# Patient Record
Sex: FEMALE | Race: WHITE | NOT HISPANIC OR LATINO | Employment: FULL TIME | URBAN - METROPOLITAN AREA
[De-identification: names, ages, dates, MRNs, and addresses within clinical notes are randomized per-mention and may not be internally consistent; named-entity substitution may affect disease eponyms.]

---

## 2017-03-22 ENCOUNTER — TELEPHONE (OUTPATIENT)
Dept: ORTHOPEDIC SURGERY | Facility: CLINIC | Age: 47
End: 2017-03-22

## 2017-04-05 ENCOUNTER — OFFICE VISIT (OUTPATIENT)
Dept: ORTHOPEDIC SURGERY | Facility: CLINIC | Age: 47
End: 2017-04-05

## 2017-04-05 VITALS — WEIGHT: 215 LBS | BODY MASS INDEX: 35.82 KG/M2 | HEIGHT: 65 IN | TEMPERATURE: 97.6 F

## 2017-04-05 DIAGNOSIS — M77.41 METATARSALGIA OF BOTH FEET: ICD-10-CM

## 2017-04-05 DIAGNOSIS — M77.42 METATARSALGIA OF BOTH FEET: ICD-10-CM

## 2017-04-05 DIAGNOSIS — M79.672 BILATERAL FOOT PAIN: Primary | ICD-10-CM

## 2017-04-05 DIAGNOSIS — M79.671 BILATERAL FOOT PAIN: Primary | ICD-10-CM

## 2017-04-05 PROCEDURE — 73630 X-RAY EXAM OF FOOT: CPT | Performed by: ORTHOPAEDIC SURGERY

## 2017-04-05 PROCEDURE — 99204 OFFICE O/P NEW MOD 45 MIN: CPT | Performed by: ORTHOPAEDIC SURGERY

## 2017-04-06 NOTE — PROGRESS NOTES
"   New Patient Complaint      Patient: Bonny NAVARRO  YOB: 1970 46 y.o. female  Medical Record Number: [id number]    Chief Complaints: My feet hurt    History of Present Illness:  Patient has a 4-6 month history of moderate intermittent stabbing aching pain along the lateral aspect of both feet worse when she first gets up in the morning and is improved some with use of more accommodative athletic type shoes.  It's worse with prolonged standing and walking.  No injury of which she is aware.  She's had only minimal relief with use of ibuprofen.      HPI    Allergies:   Allergies   Allergen Reactions   • Xylocaine [Lidocaine]        Medications:   No current outpatient prescriptions on file prior to visit.     No current facility-administered medications on file prior to visit.        History reviewed. No pertinent past medical history.  History reviewed. No pertinent surgical history.  Social History     Occupational History   • Not on file.     Social History Main Topics   • Smoking status: Never Smoker   • Smokeless tobacco: Never Used   • Alcohol use Yes   • Drug use: No   • Sexual activity: Defer      Social History     Social History Narrative   • No narrative on file     History reviewed. No pertinent family history.    Review of Systems: 14 point review of systems performed, positive pertinent findings identified in HPI. All remaining systems negative     Review of Systems      Physical Exam:   Vitals:    04/05/17 1017   Temp: 97.6 °F (36.4 °C)   Weight: 215 lb (97.5 kg)   Height: 65\" (165.1 cm)     Physical Exam   Constitutional: pleasant, well developed   Eyes: sclera non icteric  Hearing : adequate for exam  Cardiovascular: palpable pulses in foot, calf/ thigh NT without sign of DVT  Respiratoy: breathing unlabored   Neurological: grossly sensate to LT throughout LE  Psychiatric: oriented with normal mood and affect.   Lymphatic: No palpable popliteal lymphadenopathy  Skin: intact " throughout leg/foot  Musculoskeletal:Nonantalgic gait.  Left feet show no obvious swelling warmth or erythema.  There is mild discomfort palpation along the midshaft of the fifth metatarsal bilaterally but no focal pain over the fifth MTP joints or base of the fifth metatarsals bilaterally with 5 out of 5 eversion strength.  On standing shows relatively neutral alignment may be slight pronation.  Physical Exam  Ortho Exam    Radiology: 3 views of both feet were ordered to evaluate foot pain reviewed and no prior x-rays available for comparison.  I don't see any gross malalignment.  There is a questionable area of some mild periosteal reaction along fifth metatarsal left greater than right.    Assessment/Plan: Bilateral foot pain of unclear etiology.  She doesn't have significant other musculoskeletal complaints other than a little bit of stiffness and wanted her fingers.  We'll have her continue with accommodative well cushioned sturdy shoes.  I want to get an MRI of both of her feet to make sure there is not some type of stress reaction involving given her focal persistent pain.  This will help tailor treatment algorithm I will see her back pending results.  She understands to call to schedule follow-up appointment once MRI has been scheduled

## 2017-04-19 ENCOUNTER — TELEPHONE (OUTPATIENT)
Dept: ORTHOPEDIC SURGERY | Facility: CLINIC | Age: 47
End: 2017-04-19

## 2017-04-19 DIAGNOSIS — M79.671 FOOT PAIN, BILATERAL: Primary | ICD-10-CM

## 2017-04-19 DIAGNOSIS — M79.672 FOOT PAIN, BILATERAL: Primary | ICD-10-CM

## 2017-05-02 ENCOUNTER — HOSPITAL ENCOUNTER (OUTPATIENT)
Dept: PHYSICAL THERAPY | Facility: HOSPITAL | Age: 47
Setting detail: THERAPIES SERIES
Discharge: HOME | End: 2017-05-02
Attending: ORTHOPAEDIC SURGERY

## 2017-05-02 DIAGNOSIS — M79.671 PAIN IN BOTH FEET: Primary | ICD-10-CM

## 2017-05-02 DIAGNOSIS — M79.672 PAIN IN BOTH FEET: Primary | ICD-10-CM

## 2017-05-02 PROCEDURE — 97162 PT EVAL MOD COMPLEX 30 MIN: CPT | Performed by: PHYSICAL THERAPIST

## 2017-05-02 PROCEDURE — 97110 THERAPEUTIC EXERCISES: CPT | Performed by: PHYSICAL THERAPIST

## 2017-06-07 ENCOUNTER — DOCUMENTATION (OUTPATIENT)
Dept: PHYSICAL THERAPY | Facility: HOSPITAL | Age: 47
End: 2017-06-07

## 2017-06-07 DIAGNOSIS — M79.672 PAIN IN BOTH FEET: Primary | ICD-10-CM

## 2017-06-07 DIAGNOSIS — M79.671 PAIN IN BOTH FEET: Primary | ICD-10-CM

## 2017-06-07 NOTE — THERAPY DISCHARGE NOTE
Outpatient Physical Therapy Discharge Summary         Patient Name: Bonny NAVARRO  : 1970  MRN: [id number]    Today's Date: 2017    Visit Dx:    ICD-10-CM ICD-9-CM   1. Pain in both feet M79.671 729.5    M79.672              PT OP Goals       17 1400       PT Short Term Goals    STG Date to Achieve 17  -KH     STG 1 Patient will demonstrate safety and independence with initial HEP  -KH     STG 1 Progress Not Met  -KH     STG 2 Patient will report reduction in symptoms from 3x/week to </=1x/week  -KH     STG 2 Progress Not Met  -KH     STG 3 Patient will report reductionin peak pain level from 9/10 to 7/10  -KH     STG 3 Progress Not Met  -KH     Long Term Goals    LTG Date to Achieve 17  -KH     LTG 1 Patient will demonstrate safety and independence with advanced core stabilization exercises.  -KH     LTG 1 Progress Not Met  -KH     LTG 2 Patient will demonstrate good postural awareness with therapy exercises  -KH     LTG 2 Progress Not Met  -KH     LTG 3 Patient will report reductionin peak pain level from 9/10 to 5/10  -KH     LTG 3 Progress Not Met  -KH       User Key  (r) = Recorded By, (t) = Taken By, (c) = Cosigned By    Initials Name Provider Type    PREM Barnard, RODERICK Physical Therapist          OP PT Discharge Summary  Date of Discharge: 17  Reason for Discharge: Unable to participate  Outcomes Achieved: Unable to tolerate or actively participate in therapy  Discharge Destination: Home without follow-up  Discharge Instructions: Patient evaluated and never scheduled follow up appointments      Time Calculation:                    Sneha Barnard PT  2017

## 2020-01-29 NOTE — TELEPHONE ENCOUNTER
I spoke with patient she is decided not to have the MRIs due to high cost.  We talked about treatment options and like to get her in [place] over at [facility] for physical therapy evaluation for some possible things for her feet possibly orthotics.  She'll do that and if things don't improve over the next several months she'll let me know and we'll go further.  
n/a

## 2022-04-25 ENCOUNTER — OFFICE VISIT (OUTPATIENT)
Dept: FAMILY MEDICINE CLINIC | Facility: CLINIC | Age: 52
End: 2022-04-25

## 2022-04-25 VITALS
SYSTOLIC BLOOD PRESSURE: 126 MMHG | HEART RATE: 87 BPM | BODY MASS INDEX: 39.65 KG/M2 | WEIGHT: 238 LBS | DIASTOLIC BLOOD PRESSURE: 86 MMHG | HEIGHT: 65 IN | OXYGEN SATURATION: 98 % | TEMPERATURE: 98 F

## 2022-04-25 DIAGNOSIS — Z12.11 COLON CANCER SCREENING: ICD-10-CM

## 2022-04-25 DIAGNOSIS — Z13.0 SCREENING FOR IRON DEFICIENCY ANEMIA: ICD-10-CM

## 2022-04-25 DIAGNOSIS — M54.50 ACUTE MIDLINE LOW BACK PAIN WITHOUT SCIATICA: ICD-10-CM

## 2022-04-25 DIAGNOSIS — R06.83 SNORING: ICD-10-CM

## 2022-04-25 DIAGNOSIS — Z00.00 ROUTINE GENERAL MEDICAL EXAMINATION AT A HEALTH CARE FACILITY: Primary | ICD-10-CM

## 2022-04-25 DIAGNOSIS — Z13.6 SCREENING FOR ISCHEMIC HEART DISEASE: ICD-10-CM

## 2022-04-25 DIAGNOSIS — Z13.1 SCREENING FOR DIABETES MELLITUS: ICD-10-CM

## 2022-04-25 PROCEDURE — 99396 PREV VISIT EST AGE 40-64: CPT | Performed by: NURSE PRACTITIONER

## 2022-04-25 PROCEDURE — 99213 OFFICE O/P EST LOW 20 MIN: CPT | Performed by: NURSE PRACTITIONER

## 2022-04-25 RX ORDER — CYCLOBENZAPRINE HCL 10 MG
10 TABLET ORAL 3 TIMES DAILY PRN
Qty: 30 TABLET | Refills: 0 | Status: SHIPPED | OUTPATIENT
Start: 2022-04-25 | End: 2022-08-16

## 2022-04-25 NOTE — PROGRESS NOTES
"Chief Complaint  Annual Exam (Pt not fasting for labs, says she hasn't had physical in a while but has not been sick ) and Back Pain (Pt gets tightness in lower back after standing for long periods pf time, happens every other day)    Subjective          Bonny NAVARRO presents to [facility]  History of Present Illness  Well Adult Physical: Patient here for a comprehensive physical exam.The patient reports see below  Do you take any herbs or supplements that were not prescribed by a doctor? no Are you taking calcium supplements? no Are you taking aspirin daily? no    Low back pain- pt has a tightness across low back if standing for a long period of time or doing any work that involves a lot of bending over like yard work.  No numbness or tingling.    Snoring- pt has snored for years and can \sleep all night but stil wake up tired.  Requesting referral for sleep study        Objective   Vital Signs:   /86   Pulse 87   Temp 98 °F (36.7 °C)   Ht 165.1 cm (65\")   Wt 108 kg (238 lb)   SpO2 98%   BMI 39.61 kg/m²            Physical Exam  Vitals reviewed.   Constitutional:       General: She is not in acute distress.     Appearance: She is well-developed. She is not diaphoretic.   HENT:      Head: Normocephalic and atraumatic. Hair is normal.      Right Ear: Hearing, tympanic membrane, ear canal and external ear normal. No decreased hearing noted. No drainage.      Left Ear: Hearing, tympanic membrane, ear canal and external ear normal. No decreased hearing noted.      Nose: No nasal deformity.   Eyes:      General: Lids are normal.         Right eye: No discharge.         Left eye: No discharge.      Conjunctiva/sclera: Conjunctivae normal.      Pupils: Pupils are equal, round, and reactive to light.   Neck:      Thyroid: No thyromegaly.      Vascular: No JVD.      Trachea: No tracheal deviation.   Cardiovascular:      Rate and Rhythm: Normal rate and regular rhythm.      " Pulses: Normal pulses.      Heart sounds: Normal heart sounds. No murmur heard.    No friction rub. No gallop.   Pulmonary:      Effort: Pulmonary effort is normal. No respiratory distress.      Breath sounds: Normal breath sounds. No wheezing or rales.   Chest:      Chest wall: No tenderness.   Abdominal:      General: Bowel sounds are normal. There is no distension.      Palpations: Abdomen is soft. There is no mass.      Tenderness: There is no abdominal tenderness. There is no guarding or rebound.      Hernia: No hernia is present.   Musculoskeletal:         General: No tenderness or deformity. Normal range of motion.      Cervical back: Normal range of motion and neck supple.   Lymphadenopathy:      Cervical: No cervical adenopathy.   Skin:     General: Skin is warm and dry.      Findings: No erythema or rash.   Neurological:      Mental Status: She is alert and oriented to person, place, and time.      Cranial Nerves: No cranial nerve deficit.      Motor: No abnormal muscle tone.      Coordination: Coordination normal.      Deep Tendon Reflexes: Reflexes are normal and symmetric. Reflexes normal.   Psychiatric:         Behavior: Behavior normal.         Thought Content: Thought content normal.         Judgment: Judgment normal.        Result Review :   The following data was reviewed by: DON Holman on 04/25/2022:                              Assessment and Plan    Diagnoses and all orders for this visit:    1. Routine general medical examination at a health care facility (Primary)    2. Screening for iron deficiency anemia  -     CBC & Differential    3. Screening for diabetes mellitus  -     Comprehensive Metabolic Panel    4. Screening for ischemic heart disease  -     Lipid Panel With LDL / HDL Ratio    5. Colon cancer screening  -     Cologuard - Stool, Per Rectum; Future    6. Acute midline low back pain without sciatica  -     cyclobenzaprine (FLEXERIL) 10 MG tablet; Take 1 tablet by mouth 3  (Three) Times a Day As Needed for Muscle Spasms.  Dispense: 30 tablet; Refill: 0    7. Snoring  -     Ambulatory Referral to Sleep Medicine        Follow Up   Return if symptoms worsen or fail to improve.  Patient was given instructions and counseling regarding her condition or for health maintenance advice. Please see specific information pulled into the AVS if appropriate.     Discussed weight, diet and exercise  Follow up after labs  Referral to sleep med  Discussed back pain and weight loss, exercises to strengthen core and back   Mask and googles worn

## 2022-04-27 LAB
ALBUMIN SERPL-MCNC: 4.3 G/DL (ref 3.8–4.8)
ALBUMIN/GLOB SERPL: 1.9 {RATIO} (ref 1.2–2.2)
ALP SERPL-CCNC: 69 IU/L (ref 44–121)
ALT SERPL-CCNC: 28 IU/L (ref 0–32)
AST SERPL-CCNC: 17 IU/L (ref 0–40)
BASOPHILS # BLD AUTO: 0 X10E3/UL (ref 0–0.2)
BASOPHILS NFR BLD AUTO: 1 %
BILIRUB SERPL-MCNC: 0.4 MG/DL (ref 0–1.2)
BUN SERPL-MCNC: 12 MG/DL (ref 6–24)
BUN/CREAT SERPL: 14 (ref 9–23)
CALCIUM SERPL-MCNC: 9.7 MG/DL (ref 8.7–10.2)
CHLORIDE SERPL-SCNC: 102 MMOL/L (ref 96–106)
CHOLEST SERPL-MCNC: 213 MG/DL (ref 100–199)
CO2 SERPL-SCNC: 22 MMOL/L (ref 20–29)
CREAT SERPL-MCNC: 0.86 MG/DL (ref 0.57–1)
EGFRCR SERPLBLD CKD-EPI 2021: 83 ML/MIN/1.73
EOSINOPHIL # BLD AUTO: 0.2 X10E3/UL (ref 0–0.4)
EOSINOPHIL NFR BLD AUTO: 3 %
ERYTHROCYTE [DISTWIDTH] IN BLOOD BY AUTOMATED COUNT: 12.5 % (ref 11.7–15.4)
GLOBULIN SER CALC-MCNC: 2.3 G/DL (ref 1.5–4.5)
GLUCOSE SERPL-MCNC: 171 MG/DL (ref 65–99)
HCT VFR BLD AUTO: 46.4 % (ref 34–46.6)
HDLC SERPL-MCNC: 43 MG/DL
HGB BLD-MCNC: 16.2 G/DL (ref 11.1–15.9)
IMM GRANULOCYTES # BLD AUTO: 0 X10E3/UL (ref 0–0.1)
IMM GRANULOCYTES NFR BLD AUTO: 0 %
LDLC SERPL CALC-MCNC: 134 MG/DL (ref 0–99)
LDLC/HDLC SERPL: 3.1 RATIO (ref 0–3.2)
LYMPHOCYTES # BLD AUTO: 2.3 X10E3/UL (ref 0.7–3.1)
LYMPHOCYTES NFR BLD AUTO: 38 %
MCH RBC QN AUTO: 31.2 PG (ref 26.6–33)
MCHC RBC AUTO-ENTMCNC: 34.9 G/DL (ref 31.5–35.7)
MCV RBC AUTO: 89 FL (ref 79–97)
MONOCYTES # BLD AUTO: 0.4 X10E3/UL (ref 0.1–0.9)
MONOCYTES NFR BLD AUTO: 6 %
NEUTROPHILS # BLD AUTO: 3.2 X10E3/UL (ref 1.4–7)
NEUTROPHILS NFR BLD AUTO: 52 %
PLATELET # BLD AUTO: 306 X10E3/UL (ref 150–450)
POTASSIUM SERPL-SCNC: 4.5 MMOL/L (ref 3.5–5.2)
PROT SERPL-MCNC: 6.6 G/DL (ref 6–8.5)
RBC # BLD AUTO: 5.2 X10E6/UL (ref 3.77–5.28)
SODIUM SERPL-SCNC: 140 MMOL/L (ref 134–144)
TRIGL SERPL-MCNC: 204 MG/DL (ref 0–149)
VLDLC SERPL CALC-MCNC: 36 MG/DL (ref 5–40)
WBC # BLD AUTO: 6.1 X10E3/UL (ref 3.4–10.8)

## 2022-04-29 DIAGNOSIS — R73.09 ELEVATED GLUCOSE: Primary | ICD-10-CM

## 2022-04-30 LAB
HBA1C MFR BLD: 8.2 % (ref 4.8–5.6)
Lab: NORMAL
WRITTEN AUTHORIZATION: NORMAL

## 2022-05-03 ENCOUNTER — OFFICE VISIT (OUTPATIENT)
Dept: FAMILY MEDICINE CLINIC | Facility: CLINIC | Age: 52
End: 2022-05-03

## 2022-05-03 VITALS
OXYGEN SATURATION: 98 % | BODY MASS INDEX: 39.49 KG/M2 | DIASTOLIC BLOOD PRESSURE: 88 MMHG | WEIGHT: 237 LBS | TEMPERATURE: 97.8 F | HEART RATE: 84 BPM | HEIGHT: 65 IN | SYSTOLIC BLOOD PRESSURE: 138 MMHG

## 2022-05-03 DIAGNOSIS — E11.65 TYPE 2 DIABETES MELLITUS WITH HYPERGLYCEMIA, WITHOUT LONG-TERM CURRENT USE OF INSULIN: Primary | ICD-10-CM

## 2022-05-03 PROCEDURE — 99213 OFFICE O/P EST LOW 20 MIN: CPT | Performed by: NURSE PRACTITIONER

## 2022-05-03 RX ORDER — METFORMIN HYDROCHLORIDE 500 MG/1
1000 TABLET, EXTENDED RELEASE ORAL
Qty: 180 TABLET | Refills: 1 | Status: SHIPPED | OUTPATIENT
Start: 2022-05-03 | End: 2022-08-16 | Stop reason: SDUPTHER

## 2022-05-03 NOTE — PROGRESS NOTES
"Chief Complaint  Diabetes (Pt newly diagnosed after high a1c level )    Subjective          Bonny JUSTICE RAMON presents to [facility]  History of Present Illness  Patient presenting to the office today to follow-up on recent lab work that showed an A1c of 8.2.  Discussed diabetes at length with the patient and we have agreed for her to start on metformin extended release 1000 mg daily with a meal.  We also discussed her cholesterol being elevated and the new goal for her LDL is to be less than 70.  Patient like to wait on adding this medication for 6 months to see if she can do lifestyle changes including diet exercise and weight loss and then recheck this lab.  Objective   Vital Signs:   /88   Pulse 84   Temp 97.8 °F (36.6 °C)   Ht 165.1 cm (65\")   Wt 108 kg (237 lb)   SpO2 98%   BMI 39.44 kg/m²     Physical Exam  Vitals reviewed.   Constitutional:       General: She is not in acute distress.     Appearance: She is well-developed.   HENT:      Head: Normocephalic.   Cardiovascular:      Rate and Rhythm: Normal rate and regular rhythm.      Heart sounds: Normal heart sounds.   Pulmonary:      Effort: Pulmonary effort is normal.      Breath sounds: Normal breath sounds.   Neurological:      Mental Status: She is alert and oriented to person, place, and time.      Gait: Gait normal.   Psychiatric:         Behavior: Behavior normal.         Thought Content: Thought content normal.         Judgment: Judgment normal.        Result Review :   The following data was reviewed by: DON Holman on 05/03/2022:  CMP    CMP 4/26/22   Glucose 171 (A)   BUN 12   Creatinine 0.86   Sodium 140   Potassium 4.5   Chloride 102   Calcium 9.7   Total Protein 6.6   Albumin 4.3   Globulin 2.3   Total Bilirubin 0.4   Alkaline Phosphatase 69   AST (SGOT) 17   ALT (SGPT) 28   (A) Abnormal value            CBC w/diff    CBC w/Diff 4/26/22   WBC 6.1   RBC 5.20   Hemoglobin 16.2 (A)   Hematocrit " 46.4   MCV 89   MCH 31.2   MCHC 34.9   RDW 12.5   Platelets 306   Neutrophil Rel % 52   Lymphocyte Rel % 38   Monocyte Rel % 6   Eosinophil Rel % 3   Basophil Rel % 1   (A) Abnormal value            Lipid Panel    Lipid Panel 4/26/22   Total Cholesterol 213 (A)   Triglycerides 204 (A)   HDL Cholesterol 43   VLDL Cholesterol 36   LDL Cholesterol  134 (A)   LDL/HDL Ratio 3.1   (A) Abnormal value       Comments are available for some flowsheets but are not being displayed.               Most Recent A1C    HGBA1C Most Recent 4/26/22   Hemoglobin A1C 8.2 (A)   (A) Abnormal value       Comments are available for some flowsheets but are not being displayed.                     Assessment and Plan    Diagnoses and all orders for this visit:    1. Type 2 diabetes mellitus with hyperglycemia, without long-term current use of insulin (HCC) (Primary)  -     metFORMIN ER (Glucophage XR) 500 MG 24 hr tablet; Take 2 tablets by mouth Daily With Breakfast.  Dispense: 180 tablet; Refill: 1               Follow Up   Return in about 3 months (around 8/3/2022) for Recheck.  Patient was given instructions and counseling regarding her condition or for health maintenance advice. Please see specific information pulled into the AVS if appropriate.     I do not want the patient to be checking her blood sugar at this time because I am afraid that she will be overwhelmed.  I would like for her to more focus on changing her lifestyle including diet weight loss and increasing activity.  Metformin ER 1000 mg once a day she does need return to the office in 3 months for an A1c check and return in the office in 6 months for a cholesterol check    Mask and googles worn

## 2022-06-21 ENCOUNTER — APPOINTMENT (OUTPATIENT)
Dept: SLEEP MEDICINE | Facility: HOSPITAL | Age: 52
End: 2022-06-21

## 2022-06-21 ENCOUNTER — TELEPHONE (OUTPATIENT)
Dept: FAMILY MEDICINE CLINIC | Facility: CLINIC | Age: 52
End: 2022-06-21

## 2022-06-21 NOTE — TELEPHONE ENCOUNTER
Informed pt that her results are negative for her cologuard test. Also spoke to pt regarding her needing to choose a new PCP she wants to schedule with Dr. Aviles once her schedule becomes available. Pt is aware of results

## 2022-08-15 ENCOUNTER — APPOINTMENT (OUTPATIENT)
Dept: SLEEP MEDICINE | Facility: HOSPITAL | Age: 52
End: 2022-08-15

## 2022-08-16 ENCOUNTER — OFFICE VISIT (OUTPATIENT)
Dept: FAMILY MEDICINE CLINIC | Facility: CLINIC | Age: 52
End: 2022-08-16

## 2022-08-16 VITALS
TEMPERATURE: 97.1 F | DIASTOLIC BLOOD PRESSURE: 82 MMHG | BODY MASS INDEX: 34.52 KG/M2 | RESPIRATION RATE: 16 BRPM | HEART RATE: 64 BPM | SYSTOLIC BLOOD PRESSURE: 116 MMHG | HEIGHT: 65 IN | OXYGEN SATURATION: 99 % | WEIGHT: 207.2 LBS

## 2022-08-16 DIAGNOSIS — E78.2 MODERATE MIXED HYPERLIPIDEMIA NOT REQUIRING STATIN THERAPY: Chronic | ICD-10-CM

## 2022-08-16 DIAGNOSIS — Z00.00 ENCOUNTER FOR HEALTH MAINTENANCE EXAMINATION: ICD-10-CM

## 2022-08-16 DIAGNOSIS — E11.65 TYPE 2 DIABETES MELLITUS WITH HYPERGLYCEMIA, WITHOUT LONG-TERM CURRENT USE OF INSULIN: Primary | ICD-10-CM

## 2022-08-16 LAB
EXPIRATION DATE: NORMAL
HBA1C MFR BLD: 5.8 %
Lab: NORMAL

## 2022-08-16 PROCEDURE — 99214 OFFICE O/P EST MOD 30 MIN: CPT | Performed by: STUDENT IN AN ORGANIZED HEALTH CARE EDUCATION/TRAINING PROGRAM

## 2022-08-16 PROCEDURE — 90471 IMMUNIZATION ADMIN: CPT | Performed by: STUDENT IN AN ORGANIZED HEALTH CARE EDUCATION/TRAINING PROGRAM

## 2022-08-16 PROCEDURE — 83036 HEMOGLOBIN GLYCOSYLATED A1C: CPT | Performed by: STUDENT IN AN ORGANIZED HEALTH CARE EDUCATION/TRAINING PROGRAM

## 2022-08-16 PROCEDURE — 90715 TDAP VACCINE 7 YRS/> IM: CPT | Performed by: STUDENT IN AN ORGANIZED HEALTH CARE EDUCATION/TRAINING PROGRAM

## 2022-08-16 RX ORDER — CETIRIZINE HYDROCHLORIDE 10 MG/1
TABLET ORAL
COMMUNITY
Start: 2013-10-21

## 2022-08-16 RX ORDER — METFORMIN HYDROCHLORIDE 500 MG/1
500 TABLET, EXTENDED RELEASE ORAL
Qty: 180 TABLET | Refills: 1 | Status: SHIPPED | OUTPATIENT
Start: 2022-08-16 | End: 2022-11-18

## 2022-08-16 NOTE — PROGRESS NOTES
"Chief Complaint  Diabetes (A1c checked.) and Establish Care    Subjective        Bonny KIKA Annettecolby Boucher presents to [facility]  Patient is doing well today.  She has lost 30 pounds since her last visit.  She has primarily done this through diet specifically intermittent fasting.  She has also been walking more.    Was started on metformin at her last appointment.  Initially had 2 weeks of diarrhea but that has since resolved.  She states that she is actually more constipated.  She started taking daily MiraLAX.    No other concerns today.      Objective   Vital Signs:  /82   Pulse 64   Temp 97.1 °F (36.2 °C)   Resp 16   Ht 165.1 cm (65\")   Wt 94 kg (207 lb 3.2 oz)   SpO2 99%   BMI 34.48 kg/m²   Estimated body mass index is 34.48 kg/m² as calculated from the following:    Height as of this encounter: 165.1 cm (65\").    Weight as of this encounter: 94 kg (207 lb 3.2 oz).    BMI is >= 30 and <35. (Class 1 Obesity). The following options were offered after discussion;: exercise counseling/recommendations, nutrition counseling/recommendations and congratulated on 30lb weight loss.      Physical Exam  Constitutional:       General: She is not in acute distress.  Eyes:      Conjunctiva/sclera: Conjunctivae normal.   Cardiovascular:      Rate and Rhythm: Normal rate and regular rhythm.   Pulmonary:      Effort: Pulmonary effort is normal. No respiratory distress.      Breath sounds: Normal breath sounds.   Musculoskeletal:      Right lower leg: No edema.      Left lower leg: No edema.   Skin:     General: Skin is warm and dry.   Neurological:      Mental Status: She is alert and oriented to person, place, and time.   Psychiatric:         Mood and Affect: Mood normal.         Behavior: Behavior normal.        Result Review :  The following data was reviewed by: Ny Aviles MD on 08/16/2022:  Common labs    Common Labsle 4/26/22 4/26/22 4/26/22 4/26/22 8/16/22    0829 0829 0829 " 0829    Glucose  171 (A)      BUN  12      Creatinine  0.86      Sodium  140      Potassium  4.5      Chloride  102      Calcium  9.7      Total Protein  6.6      Albumin  4.3      Total Bilirubin  0.4      Alkaline Phosphatase  69      AST (SGOT)  17      ALT (SGPT)  28      WBC 6.1       Hemoglobin 16.2 (A)       Hematocrit 46.4       Platelets 306       Total Cholesterol   213 (A)     Triglycerides   204 (A)     HDL Cholesterol   43     LDL Cholesterol    134 (A)     Hemoglobin A1C    8.2 (A) 5.8   (A) Abnormal value       Comments are available for some flowsheets but are not being displayed.           Data reviewed: none          Assessment and Plan   Diagnoses and all orders for this visit:    1. Type 2 diabetes mellitus with hyperglycemia, without long-term current use of insulin (HCC) (Primary)  Assessment & Plan:  Controlled without evidence of neuropathy, nephropathy, vision changes.  Diagnosed in 4/2022.  Last hemoglobin A1c of 8.2% on 4/2022.  A1c today 5.8%  Goal A1c of 7%.  Asymptomatic without polyuria or polydipsia, no symptoms of hyper or hypoglycemia.    Current medications: Metformin 1000 mg daily.  Not on statin/ACEi.  Patient has lost 30 pounds since last appointment.   Decrease metformin to 500 mg daily.          Orders:  -     POC Glycosylated Hemoglobin (Hb A1C)  -     Lipid Panel  -     metFORMIN ER (Glucophage XR) 500 MG 24 hr tablet; Take 1 tablet by mouth Daily With Breakfast.  Dispense: 180 tablet; Refill: 1    2. Encounter for health maintenance examination  Assessment & Plan:  Colonoscopy: Negative Cologuard June 2022.  Repeat 2025.  Cervical Cancer Screening: Cotesting negative 2019.  Repeat in 2024.  Mammogram: Normal 2022.  Repeat yearly  LDCT: Never smoker  DEXA: Indicated age 67    Immunizations: Tdap given today  Labs: Up-to-date      Orders:  -     Tdap Vaccine Greater Than or Equal To 8yo IM    3. Moderate mixed hyperlipidemia not requiring statin therapy  Assessment &  Plan:  LDL above goal of 100 in 4/2022.  Not currently on statin therapy.  Given 30 pound weight loss, will recheck today and calculate ASCVD risk based on new lipid profile.  We will determine statin therapy at that time if indicated.           I spent 30 minutes caring for Bonny on this date of service. This time includes time spent by me in the following activities:preparing for the visit, reviewing tests, performing a medically appropriate examination and/or evaluation , counseling and educating the patient/family/caregiver, ordering medications, tests, or procedures and documenting information in the medical record  Follow Up   Return in about 3 months (around 11/16/2022) for DM2.  Patient was given instructions and counseling regarding her condition or for health maintenance advice. Please see specific information pulled into the AVS if appropriate.

## 2022-08-16 NOTE — ASSESSMENT & PLAN NOTE
LDL above goal of 100 in 4/2022.  Not currently on statin therapy.  Given 30 pound weight loss, will recheck today and calculate ASCVD risk based on new lipid profile.  We will determine statin therapy at that time if indicated.

## 2022-08-16 NOTE — ASSESSMENT & PLAN NOTE
Controlled without evidence of neuropathy, nephropathy, vision changes.  Diagnosed in 4/2022.  Last hemoglobin A1c of 8.2% on 4/2022.  A1c today 5.8%  Goal A1c of 7%.  Asymptomatic without polyuria or polydipsia, no symptoms of hyper or hypoglycemia.    Current medications: Metformin 1000 mg daily.  Not on statin/ACEi.  Patient has lost 30 pounds since last appointment.   Decrease metformin to 500 mg daily.

## 2022-08-16 NOTE — ASSESSMENT & PLAN NOTE
Colonoscopy: Negative Cologuard June 2022.  Repeat 2025.  Cervical Cancer Screening: Cotesting negative 2019.  Repeat in 2024.  Mammogram: Normal 2022.  Repeat yearly  LDCT: Never smoker  DEXA: Indicated age 67    Immunizations: Tdap given today  Labs: Up-to-date

## 2022-08-17 LAB
CHOLEST SERPL-MCNC: 173 MG/DL (ref 100–199)
HDLC SERPL-MCNC: 41 MG/DL
LDLC SERPL CALC-MCNC: 114 MG/DL (ref 0–99)
TRIGL SERPL-MCNC: 99 MG/DL (ref 0–149)
VLDLC SERPL CALC-MCNC: 18 MG/DL (ref 5–40)

## 2022-08-23 ENCOUNTER — PATIENT ROUNDING (BHMG ONLY) (OUTPATIENT)
Dept: FAMILY MEDICINE CLINIC | Facility: CLINIC | Age: 52
End: 2022-08-23

## 2022-08-23 NOTE — PROGRESS NOTES
August 23, 2022    Hello, may I speak with Bonny Boucher?    My name is Linh       [facility]  [address] [place] [id number] [phone].    Before we get started may I verify your date of birth? 1970    I am calling to officially welcome you to our practice and ask about your recent visit. Is this a good time to talk? No sent my chart message

## 2022-09-19 ENCOUNTER — TELEPHONE (OUTPATIENT)
Dept: FAMILY MEDICINE CLINIC | Facility: CLINIC | Age: 52
End: 2022-09-19

## 2022-09-19 NOTE — LETTER
September 19, 2022     Bonny Boucher  [address] [address]    Patient: Bonny Boucher   YOB: 1970   Date of Visit: 9/19/2022       To Whom It May Concern:     I am writing on behalf of, Bonny Boucher who is a patient currently under my medical care. This letter is to certify the medical necessity of massage therapy. Massage therapy is reasonably expected to ameliorate her back and shoulder pain. Message therapy will assist Ms. Boucher with the ability to achieve and maintain maximum functional capacity in performing daily activities.      If you have questions please reach out to my office.        Sincerely,        MD Katrin Muhammad Glynise  09/19/22 4871  Signed    Caller: Bonny Ortiz    Relationship: Self    Best call back number: [phone]     What orders are you requesting (i.e. lab or imaging): MASSAGE     In what timeframe would the patient need to come in: DID NOT SPECIFY     Where will you receive your lab/imaging services: DID NOT SPECIFY     Additional notes:     BONNY WAS TOLD THAT HER HSA WOULD  COVER MASSAGES IF HER PROVIDER WROTE HER A LETTER OF MEDICAL NECESSITY, SHE IS CURRENTLY HAVING LOWER  BACK SPASMS , SOMETIMES IN SHOULDER AREA.

## 2022-09-19 NOTE — TELEPHONE ENCOUNTER
Caller: Bonny Ortiz    Relationship: Self    Best call back number: [phone]     What orders are you requesting (i.e. lab or imaging): MASSAGE     In what timeframe would the patient need to come in: DID NOT SPECIFY     Where will you receive your lab/imaging services: DID NOT SPECIFY     Additional notes:     BONNY WAS TOLD THAT HER HSA WOULD  COVER MASSAGES IF HER PROVIDER WROTE HER A LETTER OF MEDICAL NECESSITY, SHE IS CURRENTLY HAVING LOWER  BACK SPASMS , SOMETIMES IN SHOULDER AREA.

## 2022-11-18 ENCOUNTER — OFFICE VISIT (OUTPATIENT)
Dept: FAMILY MEDICINE CLINIC | Facility: CLINIC | Age: 52
End: 2022-11-18

## 2022-11-18 VITALS
SYSTOLIC BLOOD PRESSURE: 108 MMHG | WEIGHT: 200 LBS | TEMPERATURE: 97.7 F | OXYGEN SATURATION: 98 % | BODY MASS INDEX: 33.32 KG/M2 | DIASTOLIC BLOOD PRESSURE: 70 MMHG | HEART RATE: 68 BPM | HEIGHT: 65 IN

## 2022-11-18 DIAGNOSIS — G89.29 CHRONIC BILATERAL LOW BACK PAIN WITHOUT SCIATICA: ICD-10-CM

## 2022-11-18 DIAGNOSIS — M54.50 CHRONIC BILATERAL LOW BACK PAIN WITHOUT SCIATICA: ICD-10-CM

## 2022-11-18 DIAGNOSIS — E11.9 TYPE 2 DIABETES MELLITUS WITHOUT COMPLICATION, WITHOUT LONG-TERM CURRENT USE OF INSULIN: Primary | ICD-10-CM

## 2022-11-18 LAB
EXPIRATION DATE: NORMAL
HBA1C MFR BLD: 5.7 %
Lab: NORMAL

## 2022-11-18 PROCEDURE — 36416 COLLJ CAPILLARY BLOOD SPEC: CPT | Performed by: STUDENT IN AN ORGANIZED HEALTH CARE EDUCATION/TRAINING PROGRAM

## 2022-11-18 PROCEDURE — 83036 HEMOGLOBIN GLYCOSYLATED A1C: CPT | Performed by: STUDENT IN AN ORGANIZED HEALTH CARE EDUCATION/TRAINING PROGRAM

## 2022-11-18 PROCEDURE — 99214 OFFICE O/P EST MOD 30 MIN: CPT | Performed by: STUDENT IN AN ORGANIZED HEALTH CARE EDUCATION/TRAINING PROGRAM

## 2022-11-18 RX ORDER — CYCLOBENZAPRINE HCL 5 MG
5 TABLET ORAL 3 TIMES DAILY PRN
Qty: 30 TABLET | Refills: 2 | Status: SHIPPED | OUTPATIENT
Start: 2022-11-18

## 2022-11-18 NOTE — PROGRESS NOTES
Chief Complaint  Diabetes (3M fu - pt self dc metformin ~1M ago due to constipation)    Subjective        Bonny Bryant Sander presents to [facility]  History of Present Illness  52-year-old female with diabetes and back pain who presents for follow-up.    We had previously put her on metformin for A1c of 8.2%.  A1c improved to 5.8% at last visit.  She stopped metformin about a month ago due to constipation.  She continues to do a low-carb diet.  She has started adding back in minimal carbs.  No sugar.  He has lost almost 40 pounds since May 2022!!!    Only other issue today is chronic low back pain.  She primarily experiences back spasms.  These occur with changes of position or laying on the couch.  Improves with NSAIDs.  Diabetes  She has type 2 diabetes mellitus. No MedicAlert identification noted. The initial diagnosis of diabetes was made 6 Months ago. Pertinent negatives for hypoglycemia include no confusion, dizziness, headaches, hunger, mood changes, nervousness/anxiousness, pallor, seizures, sleepiness, speech difficulty, sweats or tremors. Pertinent negatives for diabetes include no blurred vision, no chest pain, no fatigue, no foot paresthesias, no foot ulcerations, no polydipsia, no polyphagia, no polyuria, no visual change and no weakness. Pertinent negatives for hypoglycemia complications include no blackouts, no hospitalization, no nocturnal hypoglycemia, no required assistance and no required glucagon injection. Symptoms are improving. Pertinent negatives for diabetic complications include no CVA, heart disease, impotence, nephropathy, peripheral neuropathy, PVD or retinopathy. There are no known risk factors for coronary artery disease. Current diabetic treatment includes diet. She is compliant with treatment all of the time. Her weight is decreasing steadily. She is following a diabetic diet. Meal planning includes avoidance of concentrated sweets and carbohydrate  "counting. She has not had a previous visit with a dietitian. She participates in exercise three times a week. She does not see a podiatrist.Eye exam is current.       Objective   Vital Signs:  /70   Pulse 68   Temp 97.7 °F (36.5 °C)   Ht 165.1 cm (65\")   Wt 90.7 kg (200 lb)   SpO2 98%   BMI 33.28 kg/m²   Estimated body mass index is 33.28 kg/m² as calculated from the following:    Height as of this encounter: 165.1 cm (65\").    Weight as of this encounter: 90.7 kg (200 lb).    BMI is >= 30 and <35. (Class 1 Obesity). The following options were offered after discussion;: exercise counseling/recommendations and nutrition counseling/recommendations      Physical Exam  Constitutional:       General: She is not in acute distress.  Eyes:      Conjunctiva/sclera: Conjunctivae normal.   Cardiovascular:      Rate and Rhythm: Normal rate and regular rhythm.   Pulmonary:      Effort: Pulmonary effort is normal. No respiratory distress.      Breath sounds: Normal breath sounds.   Musculoskeletal:      Comments: No vertebral tenderness.  Paraspinal muscle tightness noted.   Skin:     General: Skin is warm and dry.   Neurological:      Mental Status: She is alert and oriented to person, place, and time.      Sensory: No sensory deficit.      Motor: No weakness.   Psychiatric:         Mood and Affect: Mood normal.         Behavior: Behavior normal.        Result Review :  The following data was reviewed by: Ny Aviles MD on 11/18/2022:  Common labs    Common Labs 4/26/22 4/26/22 4/26/22 4/26/22 8/16/22 8/16/22 11/18/22    0829 0829 0829 0829 1101 1102    Glucose  171 (A)        BUN  12        Creatinine  0.86        Sodium  140        Potassium  4.5        Chloride  102        Calcium  9.7        Total Protein  6.6        Albumin  4.3        Total Bilirubin  0.4        Alkaline Phosphatase  69        AST (SGOT)  17        ALT (SGPT)  28        WBC 6.1         Hemoglobin 16.2 (A)         Hematocrit 46.4       "   Platelets 306         Total Cholesterol   213 (A)  173     Triglycerides   204 (A)  99     HDL Cholesterol   43  41     LDL Cholesterol    134 (A)  114 (A)     Hemoglobin A1C    8.2 (A)  5.8 5.7   (A) Abnormal value       Comments are available for some flowsheets but are not being displayed.           Data reviewed: none          Assessment and Plan   Diagnoses and all orders for this visit:    1. Type 2 diabetes mellitus without complication, without long-term current use of insulin (HCC) (Primary)  Assessment & Plan:  Very well controlled with current lifestyle interventions.  Patient has lost 40 pounds since May of this year!  She was on metformin 1000 mg daily initially when A1c was 8.2% in April.  We decreased to 500 mg daily at last appointment due to A1c being only 5.8% on recheck.  She has since discontinued this medication due to constipation.  A1c today is 5.7%  Continue lifestyle changes to manage diabetes.     Orders:  -     POC Glycosylated Hemoglobin (Hb A1C)    2. Chronic bilateral low back pain without sciatica  Assessment & Plan:  Likely due to paraspinal muscle tightness.  Provided home exercise plan for patient and counseled on starting yoga for strength and stretching.  Continue NSAIDs or Tylenol as needed for pain.  Heat can also be helpful for pain.  Encouraged her to do massage therapy or see a chiropractor if she believes that would be helpful.  Will prescribe low-dose muscle relaxant for use as needed for severe muscle spasms.      Orders:  -     cyclobenzaprine (FLEXERIL) 5 MG tablet; Take 1 tablet by mouth 3 (Three) Times a Day As Needed for Muscle Spasms (Pain).  Dispense: 30 tablet; Refill: 2         I spent 33 minutes caring for Bonny on this date of service. This time includes time spent by me in the following activities:preparing for the visit, reviewing tests, performing a medically appropriate examination and/or evaluation , counseling and educating the patient/family/caregiver,  ordering medications, tests, or procedures and documenting information in the medical record  Follow Up   Return in about 6 months (around 5/18/2023) for Annual physical.  Patient was given instructions and counseling regarding her condition or for health maintenance advice. Please see specific information pulled into the AVS if appropriate.       Answers for HPI/ROS submitted by the patient on 11/17/2022  What is the primary reason for your visit?: Diabetes

## 2022-11-18 NOTE — ASSESSMENT & PLAN NOTE
Likely due to paraspinal muscle tightness.  Provided home exercise plan for patient and counseled on starting yoga for strength and stretching.  Continue NSAIDs or Tylenol as needed for pain.  Heat can also be helpful for pain.  Encouraged her to do massage therapy or see a chiropractor if she believes that would be helpful.  Will prescribe low-dose muscle relaxant for use as needed for severe muscle spasms.

## 2022-11-18 NOTE — ASSESSMENT & PLAN NOTE
Very well controlled with current lifestyle interventions.  Patient has lost 40 pounds since May of this year!  She was on metformin 1000 mg daily initially when A1c was 8.2% in April.  We decreased to 500 mg daily at last appointment due to A1c being only 5.8% on recheck.  She has since discontinued this medication due to constipation.  A1c today is 5.7%  Continue lifestyle changes to manage diabetes.

## 2023-05-19 ENCOUNTER — OFFICE VISIT (OUTPATIENT)
Dept: FAMILY MEDICINE CLINIC | Facility: CLINIC | Age: 53
End: 2023-05-19
Payer: COMMERCIAL

## 2023-05-19 VITALS
OXYGEN SATURATION: 98 % | HEART RATE: 72 BPM | SYSTOLIC BLOOD PRESSURE: 118 MMHG | HEIGHT: 65 IN | WEIGHT: 213 LBS | DIASTOLIC BLOOD PRESSURE: 70 MMHG | TEMPERATURE: 97.5 F | BODY MASS INDEX: 35.49 KG/M2

## 2023-05-19 DIAGNOSIS — Z23 NEED FOR VACCINATION: ICD-10-CM

## 2023-05-19 DIAGNOSIS — R53.83 OTHER FATIGUE: ICD-10-CM

## 2023-05-19 DIAGNOSIS — E11.9 TYPE 2 DIABETES MELLITUS WITHOUT COMPLICATION, WITHOUT LONG-TERM CURRENT USE OF INSULIN: ICD-10-CM

## 2023-05-19 DIAGNOSIS — Z00.00 ENCOUNTER FOR HEALTH MAINTENANCE EXAMINATION: Primary | ICD-10-CM

## 2023-05-19 DIAGNOSIS — M25.542 ARTHRALGIA OF BOTH HANDS: ICD-10-CM

## 2023-05-19 DIAGNOSIS — M25.541 ARTHRALGIA OF BOTH HANDS: ICD-10-CM

## 2023-05-19 PROBLEM — Z91.89 INCREASED RISK OF BREAST CANCER: Status: ACTIVE | Noted: 2021-06-14

## 2023-05-19 PROBLEM — J02.9 SORE THROAT: Status: RESOLVED | Noted: 2023-05-19 | Resolved: 2023-05-19

## 2023-05-19 PROBLEM — J32.9 SINUS INFECTION: Status: RESOLVED | Noted: 2023-05-19 | Resolved: 2023-05-19

## 2023-05-19 PROBLEM — R06.02 BREATH SHORTNESS: Status: ACTIVE | Noted: 2023-05-19

## 2023-05-19 PROBLEM — H66.90 ACUTE OTITIS MEDIA: Status: ACTIVE | Noted: 2023-05-19

## 2023-05-19 PROBLEM — J02.9 SORE THROAT: Status: ACTIVE | Noted: 2023-05-19

## 2023-05-19 PROBLEM — R06.02 BREATH SHORTNESS: Status: RESOLVED | Noted: 2023-05-19 | Resolved: 2023-05-19

## 2023-05-19 PROBLEM — IMO0001 BRASH: Status: ACTIVE | Noted: 2023-05-19

## 2023-05-19 PROBLEM — IMO0001 BRASH: Status: RESOLVED | Noted: 2023-05-19 | Resolved: 2023-05-19

## 2023-05-19 PROBLEM — J32.9 SINUS INFECTION: Status: ACTIVE | Noted: 2023-05-19

## 2023-05-19 PROBLEM — H66.90 ACUTE OTITIS MEDIA: Status: RESOLVED | Noted: 2023-05-19 | Resolved: 2023-05-19

## 2023-05-19 LAB
EXPIRATION DATE: NORMAL
HBA1C MFR BLD: 5.9 %
Lab: NORMAL

## 2023-05-19 RX ORDER — DIPHENOXYLATE HYDROCHLORIDE AND ATROPINE SULFATE 2.5; .025 MG/1; MG/1
TABLET ORAL DAILY
COMMUNITY

## 2023-05-19 NOTE — PROGRESS NOTES
"Chief Complaint  Annual Exam (PT COMPLAINS OF JOINT PAIN IN HER HANDS/-ONGOING FOR ABOUT 3/4 MONTHS ) and Diabetes (A1C CHECK )    Subjective        Bonny KIKA Boucher presents to [facility]  History of Present Illness  52-year-old female with hyperlipidemia, type 2 diabetes who presents for annual exam today.    She also complains of joint pain in her hands bilaterally, mostly in the DIP and PIP joints.  She also has some swelling of her bilateral fingers and sensation of tightness.  The tips of her fingers are slightly erythematous.  She describes the pain as intermittent and sharp.  Not exacerbated by any particular activities.  Rarely numbness associated.    We also discussed her weight -she had lost 40 pounds at her last appointment!  She has regained about 10 pounds.  She states that this is likely due to relaxed diet.  A1c remains very well controlled today.    Her gynecologist is potentially going on leave.  She is due for a Pap and will let us know if she would like us to perform.  She also has IUD which we discussed she would need a gynecologist to remove/replace in the future.      Objective   Vital Signs:  /70 (BP Location: Right arm, Patient Position: Sitting, Cuff Size: Adult)   Pulse 72   Temp 97.5 °F (36.4 °C) (Infrared)   Ht 165.1 cm (65\")   Wt 96.6 kg (213 lb)   SpO2 98%   BMI 35.45 kg/m²   Estimated body mass index is 35.45 kg/m² as calculated from the following:    Height as of this encounter: 165.1 cm (65\").    Weight as of this encounter: 96.6 kg (213 lb).       Class 2 Severe Obesity (BMI >=35 and <=39.9). Obesity-related health conditions include the following: diabetes mellitus and dyslipidemias. Obesity is overall improved, although recent 10lb regain.. BMI is is above average; BMI management plan is completed. We discussed low calorie, low carb based diet program, portion control, increasing exercise and joining a fitness center or start home " based exercise program.      Physical Exam  Constitutional:       General: She is not in acute distress.  Eyes:      Conjunctiva/sclera: Conjunctivae normal.   Cardiovascular:      Rate and Rhythm: Normal rate and regular rhythm.   Pulmonary:      Effort: Pulmonary effort is normal. No respiratory distress.      Breath sounds: Normal breath sounds.   Musculoskeletal:         General: Normal range of motion.      Comments: Slightly edematous fingers bilaterally, minimal erythema at the tips of all fingers.  No boggy, inflamed joints.   Skin:     General: Skin is warm and dry.   Neurological:      Mental Status: She is alert and oriented to person, place, and time.   Psychiatric:         Mood and Affect: Mood normal.         Behavior: Behavior normal.        Result Review :  The following data was reviewed by: Ny Aviles MD on 05/19/2023:  Common labs        8/16/2022    11:01 8/16/2022    11:02 11/18/2022    13:05 5/19/2023    10:27   Common Labs   Total Cholesterol 173        Triglycerides 99        HDL Cholesterol 41        LDL Cholesterol  114        Hemoglobin A1C  5.8   5.7   5.9       Data reviewed: none             Assessment and Plan   Diagnoses and all orders for this visit:    1. Encounter for health maintenance examination (Primary)  Assessment & Plan:  Colonoscopy: Negative Cologuard June 2022.  Repeat 2025.  Cervical Cancer Screening: Cotesting negative 2019.  Repeat in 2024.  Mammogram: Normal 2022.  Repeat yearly  LDCT: Never smoker   DEXA: Indicated age 67    Immunizations: PCV20 given today  Labs: Up-to-date        2. Need for vaccination  -     Pneumococcal Conjugate Vaccine 20-Valent (PCV20)    3. Type 2 diabetes mellitus without complication, without long-term current use of insulin  -     Comprehensive Metabolic Panel  -     Lipid Panel  -     MicroAlbumin, Urine, Random - Urine, Clean Catch  -     POC Glycosylated Hemoglobin (Hb A1C)    4. Arthralgia of both hands  -     CBC Auto  Differential  -     CARTER  -     Sedimentation Rate  -     C-reactive Protein    5. Other fatigue  -     TSH           Follow Up   No follow-ups on file.  Patient was given instructions and counseling regarding her condition or for health maintenance advice. Please see specific information pulled into the AVS if appropriate.

## 2023-05-19 NOTE — ASSESSMENT & PLAN NOTE
Colonoscopy: Negative Cologuard June 2022.  Repeat 2025.  Cervical Cancer Screening: Cotesting negative 2019.  Repeat in 2024.  Mammogram: Normal 2022.  Repeat yearly  LDCT: Never smoker   DEXA: Indicated age 67    Immunizations: PCV20 given today  Labs: Up-to-date

## 2023-05-22 LAB
ALBUMIN SERPL-MCNC: 4.8 G/DL (ref 3.5–5.2)
ALBUMIN/GLOB SERPL: 2.2 G/DL
ALP SERPL-CCNC: 62 U/L (ref 39–117)
ALT SERPL-CCNC: 23 U/L (ref 1–33)
ANA SER QL: NEGATIVE
AST SERPL-CCNC: 15 U/L (ref 1–32)
BASOPHILS # BLD AUTO: 0.03 10*3/MM3 (ref 0–0.2)
BASOPHILS NFR BLD AUTO: 0.5 % (ref 0–1.5)
BILIRUB SERPL-MCNC: 0.5 MG/DL (ref 0–1.2)
BUN SERPL-MCNC: 16 MG/DL (ref 6–20)
BUN/CREAT SERPL: 18.4 (ref 7–25)
CALCIUM SERPL-MCNC: 10.2 MG/DL (ref 8.6–10.5)
CHLORIDE SERPL-SCNC: 103 MMOL/L (ref 98–107)
CHOLEST SERPL-MCNC: 196 MG/DL (ref 0–200)
CO2 SERPL-SCNC: 27.5 MMOL/L (ref 22–29)
CREAT SERPL-MCNC: 0.87 MG/DL (ref 0.57–1)
CRP SERPL-MCNC: 0.31 MG/DL (ref 0–0.5)
EGFRCR SERPLBLD CKD-EPI 2021: 81.3 ML/MIN/1.73
EOSINOPHIL # BLD AUTO: 0.15 10*3/MM3 (ref 0–0.4)
EOSINOPHIL NFR BLD AUTO: 2.3 % (ref 0.3–6.2)
ERYTHROCYTE [DISTWIDTH] IN BLOOD BY AUTOMATED COUNT: 12.7 % (ref 12.3–15.4)
ERYTHROCYTE [SEDIMENTATION RATE] IN BLOOD BY WESTERGREN METHOD: 7 MM/HR (ref 0–20)
GLOBULIN SER CALC-MCNC: 2.2 GM/DL
GLUCOSE SERPL-MCNC: 122 MG/DL (ref 65–99)
HCT VFR BLD AUTO: 48.3 % (ref 34–46.6)
HDLC SERPL-MCNC: 51 MG/DL (ref 40–60)
HGB BLD-MCNC: 16.3 G/DL (ref 12–15.9)
IMM GRANULOCYTES # BLD AUTO: 0.02 10*3/MM3 (ref 0–0.05)
IMM GRANULOCYTES NFR BLD AUTO: 0.3 % (ref 0–0.5)
LDLC SERPL CALC-MCNC: 129 MG/DL (ref 0–100)
LYMPHOCYTES # BLD AUTO: 2.01 10*3/MM3 (ref 0.7–3.1)
LYMPHOCYTES NFR BLD AUTO: 31 % (ref 19.6–45.3)
MCH RBC QN AUTO: 30.2 PG (ref 26.6–33)
MCHC RBC AUTO-ENTMCNC: 33.7 G/DL (ref 31.5–35.7)
MCV RBC AUTO: 89.6 FL (ref 79–97)
MICROALBUMIN UR-MCNC: 6 UG/ML
MONOCYTES # BLD AUTO: 0.48 10*3/MM3 (ref 0.1–0.9)
MONOCYTES NFR BLD AUTO: 7.4 % (ref 5–12)
NEUTROPHILS # BLD AUTO: 3.8 10*3/MM3 (ref 1.7–7)
NEUTROPHILS NFR BLD AUTO: 58.5 % (ref 42.7–76)
NRBC BLD AUTO-RTO: 0 /100 WBC (ref 0–0.2)
PLATELET # BLD AUTO: 329 10*3/MM3 (ref 140–450)
POTASSIUM SERPL-SCNC: 4.7 MMOL/L (ref 3.5–5.2)
PROT SERPL-MCNC: 7 G/DL (ref 6–8.5)
RBC # BLD AUTO: 5.39 10*6/MM3 (ref 3.77–5.28)
SODIUM SERPL-SCNC: 139 MMOL/L (ref 136–145)
TRIGL SERPL-MCNC: 88 MG/DL (ref 0–150)
TSH SERPL DL<=0.005 MIU/L-ACNC: 2.73 UIU/ML (ref 0.27–4.2)
VLDLC SERPL CALC-MCNC: 16 MG/DL (ref 5–40)
WBC # BLD AUTO: 6.49 10*3/MM3 (ref 3.4–10.8)

## 2025-02-14 ENCOUNTER — OFFICE VISIT (OUTPATIENT)
Dept: FAMILY MEDICINE CLINIC | Facility: CLINIC | Age: 55
End: 2025-02-14
Payer: COMMERCIAL

## 2025-02-14 VITALS
HEART RATE: 70 BPM | BODY MASS INDEX: 37.32 KG/M2 | DIASTOLIC BLOOD PRESSURE: 84 MMHG | TEMPERATURE: 96.8 F | OXYGEN SATURATION: 97 % | WEIGHT: 224 LBS | SYSTOLIC BLOOD PRESSURE: 124 MMHG | HEIGHT: 65 IN

## 2025-02-14 DIAGNOSIS — R20.0 NUMBNESS IN BOTH HANDS: ICD-10-CM

## 2025-02-14 DIAGNOSIS — E11.9 TYPE 2 DIABETES MELLITUS WITHOUT COMPLICATION, WITHOUT LONG-TERM CURRENT USE OF INSULIN: Primary | ICD-10-CM

## 2025-02-14 PROCEDURE — 99214 OFFICE O/P EST MOD 30 MIN: CPT | Performed by: STUDENT IN AN ORGANIZED HEALTH CARE EDUCATION/TRAINING PROGRAM

## 2025-02-14 NOTE — PROGRESS NOTES
"Chief Complaint  Hand Problem, Numbness (Pt reports numbness in both hands), and Joint Swelling (Pt reports ankle swelling last week.)    Subjective        Bonny Bryant Sander presents to [facility]  History of Present Illness  54-year-old female with hyperlipidemia, type 2 diabetes who presents for follow up:    She also complains of joint pain in her hands bilaterally, mostly in the DIP and PIP joints.  She also has some swelling of her bilateral fingers and sensation of tightness.  The tips of her fingers are slightly erythematous.  She describes the pain as intermittent and sharp.  Not exacerbated by any particular activities.  Rarely numbness associated.     Weight was down 40 pounds at her last appointment!  She has regained about 10 pounds.  She states that this is likely due to relaxed diet.  A1c remains very well controlled today.      Objective   Vital Signs:  /84   Pulse 70   Temp 96.8 °F (36 °C)   Ht 165.1 cm (65\")   Wt 102 kg (224 lb)   SpO2 97%   BMI 37.28 kg/m²   Estimated body mass index is 37.28 kg/m² as calculated from the following:    Height as of this encounter: 165.1 cm (65\").    Weight as of this encounter: 102 kg (224 lb).        Physical Exam  Constitutional:       General: She is not in acute distress.  Eyes:      Conjunctiva/sclera: Conjunctivae normal.   Cardiovascular:      Rate and Rhythm: Normal rate and regular rhythm.   Pulmonary:      Effort: Pulmonary effort is normal. No respiratory distress.   Abdominal:      Palpations: Abdomen is soft.      Tenderness: There is no abdominal tenderness.   Musculoskeletal:         General: No swelling or deformity. Normal range of motion.   Skin:     General: Skin is warm.      Findings: No erythema.   Neurological:      Mental Status: She is alert and oriented to person, place, and time.   Psychiatric:         Mood and Affect: Mood normal.         Behavior: Behavior normal.        Result Review :  The " following data was reviewed by: Ny Aviles MD on 02/14/2025:    Data reviewed : none           Assessment and Plan   Diagnoses and all orders for this visit:    1. Type 2 diabetes mellitus without complication, without long-term current use of insulin (Primary)  Assessment & Plan:  Very well controlled with current lifestyle interventions.   She was on metformin 1000 mg daily initially when A1c was 8.2%.  A1c today is 5.7%  Continue lifestyle changes to manage diabetes.     Orders:  -     POC Glycosylated Hemoglobin (Hb A1C)    2. Numbness in both hands  Comments:  Concern for CTS. Discussed bracing at night. May consider referral to hand surg if no improvement.             Follow Up   No follow-ups on file.  Patient was given instructions and counseling regarding her condition or for health maintenance advice. Please see specific information pulled into the AVS if appropriate.

## 2025-02-20 PROBLEM — M19.90 OSTEOARTHRITIS: Status: ACTIVE | Noted: 2025-02-20

## 2025-02-20 NOTE — ASSESSMENT & PLAN NOTE
Very well controlled with current lifestyle interventions.   She was on metformin 1000 mg daily initially when A1c was 8.2%.  A1c today is 5.7%  Continue lifestyle changes to manage diabetes.

## 2025-03-11 ENCOUNTER — TELEPHONE (OUTPATIENT)
Dept: FAMILY MEDICINE CLINIC | Facility: CLINIC | Age: 55
End: 2025-03-11

## 2025-03-11 DIAGNOSIS — E11.9 TYPE 2 DIABETES MELLITUS WITHOUT COMPLICATION, WITHOUT LONG-TERM CURRENT USE OF INSULIN: Primary | ICD-10-CM

## 2025-03-11 NOTE — TELEPHONE ENCOUNTER
Caller: Bonny Ortiz    Relationship to patient: Self    Best call back number:[phone]      Patient is needing: SHE WAS CALLING TO CHECK ON THE STATUS OF THE P.A. FOR KIM.  SHE CALLED INSURANCE AND THEY ADVISE THEY HAVE NOT RECEIVED ANYTHING YET.  SHE WILL BE OUT OF SAMPLES THIS WEEK.  CAN YOU PLEASE ADVISE THE STATUS AND ADVISE IF YOU HAVE ANY MORE SAMPLES TO GET HER THROUGH

## 2025-03-21 ENCOUNTER — TELEPHONE (OUTPATIENT)
Dept: FAMILY MEDICINE CLINIC | Facility: CLINIC | Age: 55
End: 2025-03-21
Payer: COMMERCIAL

## 2025-04-07 DIAGNOSIS — E11.9 TYPE 2 DIABETES MELLITUS WITHOUT COMPLICATION, WITHOUT LONG-TERM CURRENT USE OF INSULIN: Primary | ICD-10-CM

## 2025-05-13 ENCOUNTER — OFFICE VISIT (OUTPATIENT)
Dept: FAMILY MEDICINE CLINIC | Facility: CLINIC | Age: 55
End: 2025-05-13
Payer: COMMERCIAL

## 2025-05-13 VITALS
SYSTOLIC BLOOD PRESSURE: 122 MMHG | HEIGHT: 65 IN | OXYGEN SATURATION: 98 % | DIASTOLIC BLOOD PRESSURE: 84 MMHG | BODY MASS INDEX: 35.82 KG/M2 | WEIGHT: 215 LBS | HEART RATE: 76 BPM | TEMPERATURE: 97.5 F

## 2025-05-13 DIAGNOSIS — Z13.6 SCREENING FOR ISCHEMIC HEART DISEASE: ICD-10-CM

## 2025-05-13 DIAGNOSIS — E11.9 TYPE 2 DIABETES MELLITUS WITHOUT COMPLICATION, WITHOUT LONG-TERM CURRENT USE OF INSULIN: Primary | ICD-10-CM

## 2025-05-13 DIAGNOSIS — E55.9 VITAMIN D DEFICIENCY: ICD-10-CM

## 2025-05-13 DIAGNOSIS — R06.83 SNORING: ICD-10-CM

## 2025-05-13 LAB
EXPIRATION DATE: ABNORMAL
HBA1C MFR BLD: 6.1 % (ref 4.5–5.7)
Lab: ABNORMAL

## 2025-05-13 RX ORDER — LEVOCETIRIZINE DIHYDROCHLORIDE 5 MG/1
5 TABLET, FILM COATED ORAL EVERY EVENING
COMMUNITY

## 2025-05-13 NOTE — PROGRESS NOTES
"Chief Complaint  Type 2 diabetes mellitus without complication, without long    Subjective        Bonny A Annette Boucher presents to [facility]  History of Present Illness  54-year-old female with hyperlipidemia, type 2 diabetes who presents for follow up:         She reports a general sense of well-being, with her weight remaining stable. She has experienced a weight loss of approximately 15 pounds, which she attributes to dietary modifications, including a reduction in sugar intake and occasional single daily meals. Despite these changes, her A1c level is 6.1, which is slightly higher than expected.     Her constipation has shown improvement, which she believes is due to increased water consumption. She is also supplementing her diet with fiber gummies.    She reports an improvement in her numbness symptoms in her hands, which she manages with the use of a brace at night.  She has identified the presence of ganglion cysts, which are not associated with any pain.    She expresses concern about her snoring, which has been described as severe by her  and a friend. She does not experience morning headaches or excessive daytime sleepiness. Her sleep quality is poor, often disrupted by her 's return from work at night, making it difficult for her to resume sleep. She occasionally feels the need to nap during the day, but this is not a daily occurrence. She has no history of nasal fractures.       Objective   Vital Signs:  /84   Pulse 76   Temp 97.5 °F (36.4 °C)   Ht 165.1 cm (65\")   Wt 97.5 kg (215 lb)   SpO2 98%   BMI 35.78 kg/m²   Estimated body mass index is 35.78 kg/m² as calculated from the following:    Height as of this encounter: 165.1 cm (65\").    Weight as of this encounter: 97.5 kg (215 lb).        Physical Exam  Constitutional:       General: She is not in acute distress.  Eyes:      Conjunctiva/sclera: Conjunctivae normal.   Cardiovascular:      Rate " and Rhythm: Normal rate and regular rhythm.   Pulmonary:      Effort: Pulmonary effort is normal. No respiratory distress.   Abdominal:      Palpations: Abdomen is soft.      Tenderness: There is no abdominal tenderness.   Musculoskeletal:         General: No swelling or deformity. Normal range of motion.   Skin:     General: Skin is warm.      Findings: No erythema.   Neurological:      Mental Status: She is alert and oriented to person, place, and time.   Psychiatric:         Mood and Affect: Mood normal.         Behavior: Behavior normal.        Result Review :  The following data was reviewed by: Ny Aviles MD on 05/13/2025:  Common labs          5/13/2025    09:42   Common Labs   Hemoglobin A1C 6.1      Data reviewed : None           Assessment and Plan   Diagnoses and all orders for this visit:    1. Type 2 diabetes mellitus without complication, without long-term current use of insulin (Primary)  -     POC Glycosylated Hemoglobin (Hb A1C)  -     Tirzepatide 7.5 MG/0.5ML solution auto-injector; Inject 7.5 mg under the skin into the appropriate area as directed 1 (One) Time Per Week.  Dispense: 2 mL; Refill: 2  -     Comprehensive Metabolic Panel; Future  -     ORDER: Hemoglobin A1c; Future  -     Microalbumin / Creatinine Urine Ratio - Urine, Clean Catch; Future  -     Lipid Panel; Future    2. Screening for ischemic heart disease  -     TSH; Future    3. Vitamin D deficiency  -     Vitamin D,25-Hydroxy; Future    4. Snoring  -     Ambulatory Referral to Sleep Medicine           1. Diabetes.  - Her A1c level is currently at 6.1, which, while slightly elevated compared to her baseline.  - She is advised to continue her current regimen and maintain vigilance over her carbohydrate intake.  - The dosage of tirzepatide will be increased to 7.5 mg. She is instructed to monitor for any potential increase in constipation symptoms following the dosage adjustment. A comprehensive set of labs will be ordered prior  to her next appointment.    2.  Carpal tunnel syndrome  - She reports improvement in numbness with the use of bracing.  - She is advised to continue using the brace.  - If symptoms completely resolve, she may consider discontinuing the brace and monitoring for any recurrence. If symptoms return, she should resume using the brace.    3. Snoring.  - She reports significant snoring, which may indicate mild sleep apnea.  - Examination reveals no deviated septum.  - A referral to sleep medicine will be made for a home sleep study and potential CPAP discussion.  - Discussed the potential cardiovascular risks associated with untreated sleep apnea.    Follow-up  The patient will follow up in 6 months for her annual appointment.            Follow Up   Return in about 6 months (around 11/13/2025) for Annual physical.  Patient was given instructions and counseling regarding her condition or for health maintenance advice. Please see specific information pulled into the AVS if appropriate.     Patient or patient representative verbalized consent for the use of Ambient Listening during the visit with  Ny Aviles MD for chart documentation. 5/13/2025  10:40 EDT

## 2025-05-16 ENCOUNTER — OFFICE VISIT (OUTPATIENT)
Dept: SLEEP MEDICINE | Facility: HOSPITAL | Age: 55
End: 2025-05-16
Payer: COMMERCIAL

## 2025-05-16 VITALS
OXYGEN SATURATION: 96 % | SYSTOLIC BLOOD PRESSURE: 124 MMHG | HEART RATE: 73 BPM | WEIGHT: 214 LBS | DIASTOLIC BLOOD PRESSURE: 83 MMHG | BODY MASS INDEX: 35.65 KG/M2 | HEIGHT: 65 IN

## 2025-05-16 DIAGNOSIS — G47.10 HYPERSOMNIA: ICD-10-CM

## 2025-05-16 DIAGNOSIS — R06.83 LOUD SNORING: Primary | ICD-10-CM

## 2025-05-16 DIAGNOSIS — R06.81 WITNESSED EPISODE OF APNEA: ICD-10-CM

## 2025-05-16 PROCEDURE — G0463 HOSPITAL OUTPT CLINIC VISIT: HCPCS

## 2025-05-16 NOTE — PROGRESS NOTES
[facility]  Telephone: [phone] / Fax: [phone] [place]  Telephone: [phone] / Fax: [phone] Aleisha Angel    Referring Physician: Ny Aviles MD  PCP: Ny Aviles MD    Reason for consult:  sleep apnea    Bonny Boucher is a 54 y.o.female  was seen in the [facility] today for evaluation of sleep apnea.  She reports loud snoring ongoing for many years. It has increased with weight gain of 40-50lb over the 2-3 year period.  She wakes up feeling a bit tired in the morning. She denies morning headache. She denies excessive sweating. She does reports waking up choking/coughing from sleep.  Her ESS is 6. Her sleep schedule is 11:30pm-7am. She has DM2 diagnosed 3 years ago and is on Mounjaro.    SH- no alcohol    ROS- negative.    Bonny Boucher  has a past medical history of Allergic (1980), Diabetes mellitus (May 2022), Headache (1989), HL (hearing loss) (Past 6 months), and Hyperlipidemia (May 2022).    Current Medications:    Current Outpatient Medications:     cetirizine (zyrTEC) 10 MG tablet, Take  by mouth. (Patient not taking: Reported on 5/13/2025), Disp: , Rfl:     cyclobenzaprine (FLEXERIL) 5 MG tablet, Take 1 tablet by mouth 3 (Three) Times a Day As Needed for Muscle Spasms (Pain). (Patient not taking: Reported on 5/13/2025), Disp: 30 tablet, Rfl: 2    levocetirizine (XYZAL) 5 MG tablet, Take 1 tablet by mouth Every Evening., Disp: , Rfl:     levonorgestrel (KYLEENA) 19.5 MG intrauterine device IUD, To be inserted one time by prescriber. Route intrauterine., Disp: , Rfl:     multivitamin (MULTI-VITAMIN PO), Take  by mouth Daily., Disp: , Rfl:     Tirzepatide 7.5 MG/0.5ML solution auto-injector, Inject 7.5 mg under the skin into the appropriate area as directed 1 (One) Time Per Week., Disp: 2 mL, Rfl: 2    I have reviewed Past Medical History, Past Surgical History, Medication List, Social History and Family History as entered in Sleep  "Questionnaire and EPIC.    ESS  6   Vital Signs /83   Pulse 73   Ht 165.1 cm (65\")   Wt 97.1 kg (214 lb)   SpO2 96%   BMI 35.61 kg/m²  Body mass index is 35.61 kg/m².    General Alert and oriented. No acute distress noted   Pharynx/Throat Class IV  Mallampati airway, large tongue, no evidence of redundant lateral pharyngeal tissue. No oral lesions. No thrush. Moist mucous membranes.   Head Normocephalic. Symmetrical. Atraumatic.    Nose No septal deviation. No drainage   Chest Wall Normal shape. Symmetric expansion with respiration. No tenderness.   Neck Trachea midline, no thyromegaly or adenopathy    Lungs Clear to auscultation bilaterally. No wheezes. No rhonchi. No rales. Respirations regular, even and unlabored.   Heart Regular rhythm and normal rate. Normal S1 and S2. No murmur   Abdomen Soft, non-tender and non-distended. Normal bowel sounds. No masses.   Extremities Moves all extremities well. No edema   Psychiatric Normal mood and affect.        Impression:  1. Loud snoring    2. Hypersomnia    3. Witnessed episode of apnea          Plan:  I discussed the pathophysiology of obstructive sleep apnea with the patient.  We discussed the adverse outcomes associated with untreated sleep-disordered breathing.  We discussed treatment modalities of obstructive sleep apnea including CPAP device.     Sleep study will be scheduled to establish a definitive diagnosis of sleep disorder breathing.  Patient will visit the sleep center for a 15-minute session to learn how to use the equipment, which includes a chest band to measure breathing, a nasal device to measure airflow, and a finger probe to measure oxygen levels. Patient will wear the device overnight at home and return it for analysis.       Weight loss will be strongly beneficial in order to reduce the severity of sleep-disordered breathing.  Patient has narrow oropharyngeal structure.  Caution during activities that require prolonged concentration is " strongly advised.  After sleep study results are available, patient will be notified, and appointment will be scheduled to discuss sleep study results and treatment recommendations.        I appreciate the opportunity to participate in this patient's care.      DON Hughes  [facility], [place]  Phone: [phone]      Part of this note may be an electronic transcription/translation of spoken language to printed text using the Dragon Dictation System. Some errors may exist even though the document was edited.

## 2025-07-18 DIAGNOSIS — E11.9 TYPE 2 DIABETES MELLITUS WITHOUT COMPLICATION, WITHOUT LONG-TERM CURRENT USE OF INSULIN: ICD-10-CM

## 2025-08-08 DIAGNOSIS — Z12.11 SCREENING FOR MALIGNANT NEOPLASM OF COLON: Primary | ICD-10-CM
